# Patient Record
Sex: FEMALE | Race: BLACK OR AFRICAN AMERICAN | NOT HISPANIC OR LATINO | Employment: PART TIME | URBAN - METROPOLITAN AREA
[De-identification: names, ages, dates, MRNs, and addresses within clinical notes are randomized per-mention and may not be internally consistent; named-entity substitution may affect disease eponyms.]

---

## 2017-05-25 ENCOUNTER — RECORDS - HEALTHEAST (OUTPATIENT)
Dept: LAB | Facility: CLINIC | Age: 22
End: 2017-05-25

## 2017-05-25 LAB
CHOLEST SERPL-MCNC: 156 MG/DL
FASTING STATUS PATIENT QL REPORTED: NO
HDLC SERPL-MCNC: 51 MG/DL
LDLC SERPL CALC-MCNC: 88 MG/DL
TRIGL SERPL-MCNC: 85 MG/DL

## 2021-05-03 ENCOUNTER — RECORDS - HEALTHEAST (OUTPATIENT)
Dept: ADMINISTRATIVE | Facility: OTHER | Age: 26
End: 2021-05-03

## 2022-02-16 ENCOUNTER — APPOINTMENT (OUTPATIENT)
Dept: RADIOLOGY | Facility: CLINIC | Age: 27
End: 2022-02-16
Payer: COMMERCIAL

## 2022-02-16 ENCOUNTER — HOSPITAL ENCOUNTER (EMERGENCY)
Facility: CLINIC | Age: 27
Discharge: HOME | End: 2022-02-16
Admitting: PHYSICIAN ASSISTANT
Payer: COMMERCIAL

## 2022-02-16 VITALS
RESPIRATION RATE: 16 BRPM | DIASTOLIC BLOOD PRESSURE: 84 MMHG | TEMPERATURE: 97.8 F | BODY MASS INDEX: 45.14 KG/M2 | HEART RATE: 87 BPM | OXYGEN SATURATION: 100 % | SYSTOLIC BLOOD PRESSURE: 138 MMHG | WEIGHT: 270.95 LBS | HEIGHT: 65 IN

## 2022-02-16 DIAGNOSIS — M54.50 ACUTE RIGHT-SIDED LOW BACK PAIN WITHOUT SCIATICA: ICD-10-CM

## 2022-02-16 PROCEDURE — 250N000011 HC RX IP 250 OP 636: Performed by: PHYSICIAN ASSISTANT

## 2022-02-16 PROCEDURE — 96372 THER/PROPH/DIAG INJ SC/IM: CPT | Performed by: PHYSICIAN ASSISTANT

## 2022-02-16 PROCEDURE — 72100 X-RAY EXAM L-S SPINE 2/3 VWS: CPT

## 2022-02-16 PROCEDURE — 250N000013 HC RX MED GY IP 250 OP 250 PS 637: Performed by: PHYSICIAN ASSISTANT

## 2022-02-16 PROCEDURE — 99284 EMERGENCY DEPT VISIT MOD MDM: CPT | Mod: 25

## 2022-02-16 RX ORDER — ACETAMINOPHEN 325 MG/1
975 TABLET ORAL ONCE
Status: COMPLETED | OUTPATIENT
Start: 2022-02-16 | End: 2022-02-16

## 2022-02-16 RX ORDER — DIAZEPAM 5 MG
5 TABLET ORAL ONCE
Status: COMPLETED | OUTPATIENT
Start: 2022-02-16 | End: 2022-02-16

## 2022-02-16 RX ORDER — LIDOCAINE 50 MG/G
1 PATCH TOPICAL EVERY 24 HOURS
Qty: 10 PATCH | Refills: 0 | Status: SHIPPED | OUTPATIENT
Start: 2022-02-16 | End: 2022-02-26

## 2022-02-16 RX ORDER — KETOROLAC TROMETHAMINE 15 MG/ML
30 INJECTION, SOLUTION INTRAMUSCULAR; INTRAVENOUS ONCE
Status: COMPLETED | OUTPATIENT
Start: 2022-02-16 | End: 2022-02-16

## 2022-02-16 RX ORDER — DIAZEPAM 5 MG
5 TABLET ORAL EVERY 6 HOURS PRN
Qty: 10 TABLET | Refills: 0 | Status: SHIPPED | OUTPATIENT
Start: 2022-02-16

## 2022-02-16 RX ORDER — NAPROXEN 500 MG/1
500 TABLET ORAL 2 TIMES DAILY WITH MEALS
Qty: 28 TABLET | Refills: 0 | Status: SHIPPED | OUTPATIENT
Start: 2022-02-16 | End: 2022-03-02

## 2022-02-16 RX ORDER — LIDOCAINE 4 G/G
1 PATCH TOPICAL ONCE
Status: DISCONTINUED | OUTPATIENT
Start: 2022-02-16 | End: 2022-02-16 | Stop reason: HOSPADM

## 2022-02-16 RX ADMIN — ACETAMINOPHEN 975 MG: 325 TABLET, FILM COATED ORAL at 14:39

## 2022-02-16 RX ADMIN — DIAZEPAM 5 MG: 5 TABLET ORAL at 14:40

## 2022-02-16 RX ADMIN — KETOROLAC TROMETHAMINE 30 MG: 15 INJECTION, SOLUTION INTRAMUSCULAR; INTRAVENOUS at 14:40

## 2022-02-16 ASSESSMENT — ENCOUNTER SYMPTOMS
DYSURIA: 0
NUMBNESS: 0
FEVER: 0
BACK PAIN: 1
SHORTNESS OF BREATH: 0
CHILLS: 0
WEAKNESS: 0

## 2022-02-16 NOTE — ED PROVIDER NOTES
Emergency Department Encounter   NAME: Carter Ferreira ; AGE: 27 year old female ; YOB: 1995 ; MRN: [id number] ; PCP: Yasmeen Jaramillo   ED PROVIDER: Maryam Mcbride PA-C    Evaluation Date & Time:   2/16/2022  1:08 PM    CHIEF COMPLAINT:  Back Pain      Impression and Plan   MDM: Carter Ferreira is a 27 year old female with a pertinent history of amenorrhea, menarche, who presents to the ED by self for evaluation of low back pain.  The patient presents to the emergency department for evaluation of sudden onset right lower back pain that started after bending down to pick something up yesterday.  Here in the ED, she is vitally stable, nontoxic-appearing, though does appear uncomfortable with movement.  She has reproducible tenderness just to the right of her lumbar spine midline. No flank pain or symptoms concerning for AAA or dissection. She is not experiencing any red flag symptoms -no concern for cauda equina syndrome or indication for emergent MRI.  She is not experiencing any radicular symptoms and has a negative straight leg raise -lower suspicion for herniated disc or nerve impingement.  X-ray of her lumbar spine obtained which shows normal intervertebral disc spaces, vertebral bodies and heights -no evidence of acute fracture or traumatic subluxation.  Patient symptoms are consistent with muscular strain vs. Mild herniated disc.  She did have improvement after IM Toradol and Valium here in the ED.  We discussed plan for discharge to home with supportive measures over the next 1 to 2 weeks.  Will provide prescription for regularly dosed naproxen, Valium as needed, she will continue gentle stretching, and ice and heat at home.  We reviewed the importance of follow-up in clinic if pain is not improving and can consider nonemergent MRI or PT referral at that time.  Reviewed concerning signs and symptoms to return to the emergency department and she verbalized understanding.  Discharged home in good  condition.    ED COURSE:  1:49 PM I met and introduced myself to the patient. I gathered initial history and performed my physical exam. We discussed plan for initial workup.   4:25 PM Pain is improving. We discussed discharge, follow up, and reasons to return to the ED.     At the conclusion of the encounter I discussed the results of all the tests and the disposition. The questions were answered. The patient or family acknowledged understanding and was agreeable with the care plan.    FINAL IMPRESSION:    ICD-10-CM    1. Acute right-sided low back pain without sciatica  M54.50          MEDICATIONS GIVEN IN THE EMERGENCY DEPARTMENT:  Medications   ketorolac (TORADOL) injection 30 mg (30 mg Intramuscular Given 2/16/22 1440)   diazepam (VALIUM) tablet 5 mg (5 mg Oral Given 2/16/22 1440)   acetaminophen (TYLENOL) tablet 975 mg (975 mg Oral Given 2/16/22 1439)         NEW PRESCRIPTIONS STARTED AT TODAY'S ED VISIT:  Discharge Medication List as of 2/16/2022  4:26 PM      START taking these medications    Details   diazepam (VALIUM) 5 MG tablet Take 1 tablet (5 mg) by mouth every 6 hours as needed for anxiety or sleep, Disp-10 tablet, R-0, Local Print      lidocaine (LIDODERM) 5 % patch Place 1 patch onto the skin every 24 hours for 10 daysDisp-10 patch, R-0Local Print      naproxen (NAPROSYN) 500 MG tablet Take 1 tablet (500 mg) by mouth 2 times daily (with meals) for 14 days, Disp-28 tablet, R-0, Local Print               HPI   Patient information was obtained from: Patient    Use of Intrepreter: Language: Palestinian and English. Offered professional interpretor, however preferred to speak English.      Carter Ferreira is a 27 year old female with a pertinent history of amenorrhea, menarche, who presents to the ED by self for evaluation of low back pain.     The patient reports that she was bending over to pick something up yesterday, when she felt a sudden pop and severe pain to her right low back.  She has attempted Aleve at  "home with minimal relief.  She has exacerbated pain with any movement, bending, or sitting.  She has not had any recent falls or overt trauma to the back.  No history of back pain.  She denies any saddle anesthesia, bladder or bowel incontinence, or weakness or numbness in her legs.  No recent fevers or infectious symptoms.  She currently rates her pain a nonradiating 10 out of 10.      REVIEW OF SYSTEMS:  Review of Systems   Constitutional: Negative for chills and fever.   Respiratory: Negative for shortness of breath.    Cardiovascular: Negative for chest pain.   Genitourinary: Negative.  Negative for dysuria.   Musculoskeletal: Positive for back pain.   Skin: Negative for rash.   Neurological: Negative for weakness and numbness.   All other systems reviewed and are negative.        Medical History     Past Medical History:   Diagnosis Date     Amenorrhea      Menarche age 11       Past Surgical History:   Procedure Laterality Date     infibulation      age 4       No family history on file.    Social History     Tobacco Use     Smoking status: Never Smoker     Smokeless tobacco: Never Used   Substance Use Topics     Alcohol use: No     Drug use: No       diazepam (VALIUM) 5 MG tablet  lidocaine (LIDODERM) 5 % patch  naproxen (NAPROSYN) 500 MG tablet  Cholecalciferol (VITAMIN D) 2000 UNITS tablet  levonorgestrel-ethinyl estradiol (AVIANE,ALESSE,LESSINA) 0.1-20 MG-MCG per tablet          Physical Exam     First Vitals:  Patient Vitals for the past 24 hrs:   BP Temp Temp src Pulse Resp SpO2 Height Weight   02/16/22 1330 -- -- -- 87 -- 100 % -- --   02/16/22 1315 138/84 -- -- 83 -- 97 % -- --   02/16/22 1209 -- -- -- -- -- -- -- 122.9 kg (270 lb 15.1 oz)   02/16/22 1203 (!) 142/87 97.8  F (36.6  C) Temporal 99 16 99 % 1.651 m (5' 5\") --         PHYSICAL EXAM:   Physical Exam  Vitals and nursing note reviewed.   Constitutional:       General: She is not in acute distress.     Appearance: She is not toxic-appearing.    "   Comments: Appears uncomfortable with movement.    HENT:      Head: Normocephalic.   Eyes:      Conjunctiva/sclera: Conjunctivae normal.   Pulmonary:      Effort: Pulmonary effort is normal.   Musculoskeletal:      Comments: Tenderness to palpation to right of the lumbar midline.  No palpable bony step-offs.  No overlying skin changes.  Gait is intact besides discomfort.  5 out of 5 strength with dorsiflexion plantarflexion, knee flexion and extension, and hip flexion.  Negative straight leg raise.  2+ patellar reflexes.   Neurological:      Mental Status: She is alert.             Results     LAB:  All pertinent labs reviewed and interpreted  Labs Ordered and Resulted from Time of ED Arrival to Time of ED Departure - No data to display    RADIOLOGY:  Lumbar spine XR, 2-3 views   Final Result   IMPRESSION: There are 5 nonrib-bearing lumbar type vertebral bodies normal vertebral body heights and lateral alignment. Mild lumbar levocurvature measures 10 degrees from the superior T12 endplate to the inferior L4 plate, apex at L1-L2. No radiographic    evidence for acute fracture or traumatic subluxation. No pars defect.      Intervertebral disc spaces are well developed and well maintained. The lumbar facet joints and sacroiliac joints are unremarkable. Soft tissues are unremarkable.          Maryam Mcbride PA-C   Emergency Medicine   [facility] EMERGENCY ROOM       Maryam Mcbride PA-C  02/16/22 3213

## 2022-02-16 NOTE — ED TRIAGE NOTES
The patient presents to the ED with lower back pain that began yesterday after she bent over to pick something up. The patient reports she tried advil and massage at home without relief. The patient reports sitting is painful. Denies loss of bowel or bladder control. Ambulatory in triage.

## 2022-02-16 NOTE — Clinical Note
Carter Ferreira was seen and treated in our emergency department on 2/16/2022.  She may return to work on 02/21/2022.       If you have any questions or concerns, please don't hesitate to call.      Maryam Mcbride PA-C

## 2022-02-16 NOTE — DISCHARGE INSTRUCTIONS
As we discussed, your x-ray showed no concerning findings.  Please rest, time gentle stretching, use ice or heat for discomfort.  I will start you on naproxen and you may also take Tylenol at home.  I will also give you a small prescription for the muscle relaxant and Valium.  Valium is a strong medication and can make you drowsy.  Please not take this working, driving, or operating heavy machinery.  If at anytime you develop uncontrollable pain, fever, numbness in your groin, weakness or numbness in your legs, loss of control of your bladder or bowel, please return to the emergency department.  If your pain is not improving within the next 1 to 2 weeks, please follow-up in your clinic.

## 2022-11-21 ENCOUNTER — HOSPITAL ENCOUNTER (EMERGENCY)
Facility: CLINIC | Age: 27
Discharge: HOME | End: 2022-11-21
Admitting: NURSE PRACTITIONER
Payer: COMMERCIAL

## 2022-11-21 VITALS
SYSTOLIC BLOOD PRESSURE: 147 MMHG | HEART RATE: 107 BPM | DIASTOLIC BLOOD PRESSURE: 105 MMHG | OXYGEN SATURATION: 96 % | RESPIRATION RATE: 16 BRPM | TEMPERATURE: 98.3 F | BODY MASS INDEX: 38.27 KG/M2 | WEIGHT: 230 LBS

## 2022-11-21 DIAGNOSIS — J02.0 ACUTE STREPTOCOCCAL PHARYNGITIS: ICD-10-CM

## 2022-11-21 LAB
DEPRECATED S PYO AG THROAT QL EIA: POSITIVE
FLUAV RNA SPEC QL NAA+PROBE: NEGATIVE
FLUBV RNA RESP QL NAA+PROBE: NEGATIVE
RSV RNA SPEC NAA+PROBE: NEGATIVE
SARS-COV-2 RNA RESP QL NAA+PROBE: NEGATIVE

## 2022-11-21 PROCEDURE — 250N000013 HC RX MED GY IP 250 OP 250 PS 637: Performed by: NURSE PRACTITIONER

## 2022-11-21 PROCEDURE — 99283 EMERGENCY DEPT VISIT LOW MDM: CPT | Mod: CS

## 2022-11-21 PROCEDURE — C9803 HOPD COVID-19 SPEC COLLECT: HCPCS

## 2022-11-21 PROCEDURE — 87880 STREP A ASSAY W/OPTIC: CPT | Performed by: NURSE PRACTITIONER

## 2022-11-21 PROCEDURE — 87637 SARSCOV2&INF A&B&RSV AMP PRB: CPT | Performed by: NURSE PRACTITIONER

## 2022-11-21 RX ORDER — AMOXICILLIN 500 MG/1
1000 CAPSULE ORAL DAILY
Qty: 20 CAPSULE | Refills: 0 | Status: SHIPPED | OUTPATIENT
Start: 2022-11-21 | End: 2022-12-01

## 2022-11-21 RX ORDER — ACETAMINOPHEN 500 MG
1000 TABLET ORAL ONCE
Status: COMPLETED | OUTPATIENT
Start: 2022-11-21 | End: 2022-11-21

## 2022-11-21 RX ADMIN — ACETAMINOPHEN 1000 MG: 500 TABLET ORAL at 14:29

## 2022-11-21 NOTE — ED TRIAGE NOTES
2-3 d ST.     Triage Assessment     Row Name 11/21/22 5590       Triage Assessment (Adult)    Airway WDL WDL       Respiratory WDL    Respiratory WDL WDL       Skin Circulation/Temperature WDL    Skin Circulation/Temperature WDL WDL       Cardiac WDL    Cardiac WDL WDL       Peripheral/Neurovascular WDL    Peripheral Neurovascular WDL WDL       Cognitive/Neuro/Behavioral WDL    Cognitive/Neuro/Behavioral WDL WDL

## 2022-11-21 NOTE — DISCHARGE INSTRUCTIONS
Strep tonsillitis is the causeof your sore throat today.      TO CARE FOR YOURESELF AT HOME:  To treat the strep infection, take the oral antibiotics as prescribed.  For your pain, please use Ibuprofen 600mg every 6 hours as needed for pain.    If your symptoms worsen prior to your follow up appointment, do nothesitate to return here to the emergency department for further evaluation.    ---------------------------------------------------------------------------------------------------

## 2022-11-21 NOTE — ED PROVIDER NOTES
EMERGENCY DEPARTMENT ENCOUNTER      NAME: Carter Ferreira  AGE: 27 year old female  YOB: 1995  MRN: [id number]  EVALUATION DATE & TIME: No admission date for patient encounter.    PCP: Yasmeen Jaramillo    ED PROVIDER: RANDOLPH Jaimes CNP      Chief Complaint   Patient presents with     Pharyngitis         FINAL IMPRESSION:  1. Acute streptococcal pharyngitis          ED COURSE & MEDICAL DECISION MAKIN:20 PM I met with the patient, obtained history, performed an initial exam, and discussed options and plan for treatment here in the ED.    Pertinent Labs & Imaging studies reviewed. (See chart for details)  27 year old female presents to the Emergency Department for evaluation of sore throat.  Does have large tonsils.  No evidence for peritonsillar abscess or retropharyngeal abscess.  Handling secretions without difficulty.  Strep positive.  Will start on amoxicillin 1 g daily for 10 days.  Given instructions regarding ongoing symptom management, follow-up recommendations, return precautions    At the conclusion of the encounter I discussed the results of all of the tests and the disposition. The questions were answered. The patient or family acknowledged understanding and was agreeable with the care plan.       MEDICATIONS GIVEN IN THE EMERGENCY:  Medications   acetaminophen (TYLENOL) tablet 1,000 mg (1,000 mg Oral Given 22 1429)       NEW PRESCRIPTIONS STARTED AT TODAY'S ER VISIT  Discharge Medication List as of 2022  2:59 PM      START taking these medications    Details   amoxicillin (AMOXIL) 500 MG capsule Take 2 capsules (1,000 mg) by mouth daily for 10 days, Disp-20 capsule, R-0, E-Prescribe                  =================================================================    HPI    Patient information was obtained from: patient    Use of Intrepreter: N/A      Carter Ferreira is a 27 year old female who presents for evaluation of sore throat. Started 2 days ago. Pain increases  with swallowing. Slight cough. No fever. Handling secretions. No additional symptoms or concerns.     Denies chance of pregnancy.     REVIEW OF SYSTEMS   ROS negative except as noted in the HPI    PAST MEDICAL HISTORY:  Past Medical History:   Diagnosis Date     Amenorrhea      Menarche age 11       PAST SURGICAL HISTORY:  Past Surgical History:   Procedure Laterality Date     infibulation      age 4           CURRENT MEDICATIONS:    No current facility-administered medications for this encounter.     Current Outpatient Medications   Medication     amoxicillin (AMOXIL) 500 MG capsule     Cholecalciferol (VITAMIN D) 2000 UNITS tablet     diazepam (VALIUM) 5 MG tablet     levonorgestrel-ethinyl estradiol (AVIANE,ALESSE,LESSINA) 0.1-20 MG-MCG per tablet         ALLERGIES:  Allergies   Allergen Reactions     Nkda [No Known Drug Allergies]        FAMILY HISTORY:  No family history on file.    SOCIAL HISTORY:   Social History     Socioeconomic History     Marital status: Single   Occupational History     Employer: [employer]UD[employer]   Tobacco Use     Smoking status: Never     Smokeless tobacco: Never   Substance and Sexual Activity     Alcohol use: No     Drug use: No     Sexual activity: Never         VITALS:  Patient Vitals for the past 24 hrs:   BP Temp Temp src Pulse Resp SpO2 Weight   11/21/22 1420 (!) 147/105 98.3  F (36.8  C) Oral 107 16 96 % 104.3 kg (230 lb)       PHYSICAL EXAM    Constitutional:  Well developed, well nourished, no acute distress  EYES: Conjunctivae clear  HENT:  Atraumatic, normocephalic 3+ tonsils with exudate.  Handling secretions without difficulty.  Uvula midline.  No trismus or stridor.  Neck supple, no adenopathy or meningismus  Respiratory:  No respiratory distress    Integument: Warm, Dry, No erythema, No rash.   Neurologic:  Alert & oriented x 3           LAB:  All pertinent labs reviewed and interpreted.  Labs Ordered and Resulted from Time of ED Arrival to Time of ED Departure   STREPTOCOCCUS A  RAPID SCREEN W REFELX TO PCR - Abnormal       Result Value    Group A Strep antigen Positive (*)          RADIOLOGY:  Reviewed all pertinent imaging. Please see official radiology report.  No orders to display             PROCEDURES:   None    RANDOLPH Jaimes, CNP  Emergency Medicine  [facility] EMERGENCY ROOM  [address] [place] [id number]  [phone]  Dept: [phone]         Jarred Toscano APRN CNP  11/21/22 1528